# Patient Record
Sex: FEMALE | Race: WHITE | URBAN - METROPOLITAN AREA
[De-identification: names, ages, dates, MRNs, and addresses within clinical notes are randomized per-mention and may not be internally consistent; named-entity substitution may affect disease eponyms.]

---
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
## 2024-04-20 ENCOUNTER — APPOINTMENT (OUTPATIENT)
Facility: HOSPITAL | Age: 19
End: 2024-04-20
Payer: COMMERCIAL

## 2024-04-20 ENCOUNTER — HOSPITAL ENCOUNTER (EMERGENCY)
Facility: HOSPITAL | Age: 19
Discharge: HOME | End: 2024-04-20
Attending: EMERGENCY MEDICINE
Payer: COMMERCIAL

## 2024-04-20 VITALS
WEIGHT: 144 LBS | BODY MASS INDEX: 27.19 KG/M2 | DIASTOLIC BLOOD PRESSURE: 68 MMHG | HEART RATE: 79 BPM | SYSTOLIC BLOOD PRESSURE: 107 MMHG | OXYGEN SATURATION: 99 % | TEMPERATURE: 97.9 F | RESPIRATION RATE: 12 BRPM | HEIGHT: 61 IN

## 2024-04-20 DIAGNOSIS — R10.31 RIGHT LOWER QUADRANT ABDOMINAL PAIN: Primary | ICD-10-CM

## 2024-04-20 LAB
ALBUMIN SERPL-MCNC: 4.5 G/DL (ref 3.5–5)
ALBUMIN/GLOB SERPL: 1.6 (ref 1.1–2.2)
ALP SERPL-CCNC: 73 U/L (ref 45–117)
ALT SERPL-CCNC: 20 U/L (ref 12–78)
ANION GAP SERPL CALC-SCNC: 6 MMOL/L (ref 5–15)
APPEARANCE UR: CLEAR
AST SERPL W P-5'-P-CCNC: 13 U/L (ref 15–37)
BASOPHILS # BLD: 0.1 K/UL (ref 0–0.1)
BASOPHILS NFR BLD: 1 % (ref 0–1)
BILIRUB SERPL-MCNC: 0.5 MG/DL (ref 0.2–1)
BILIRUB UR QL: NEGATIVE
BUN SERPL-MCNC: 13 MG/DL (ref 6–20)
BUN/CREAT SERPL: 16 (ref 12–20)
CA-I BLD-MCNC: 9.5 MG/DL (ref 8.5–10.1)
CHLORIDE SERPL-SCNC: 110 MMOL/L (ref 97–108)
CO2 SERPL-SCNC: 23 MMOL/L (ref 21–32)
COLOR UR: ABNORMAL
CREAT SERPL-MCNC: 0.82 MG/DL (ref 0.55–1.02)
DIFFERENTIAL METHOD BLD: NORMAL
EOSINOPHIL # BLD: 0.2 K/UL (ref 0–0.4)
EOSINOPHIL NFR BLD: 2 % (ref 0–7)
ERYTHROCYTE [DISTWIDTH] IN BLOOD BY AUTOMATED COUNT: 12.3 % (ref 11.5–14.5)
GLOBULIN SER CALC-MCNC: 2.9 G/DL (ref 2–4)
GLUCOSE SERPL-MCNC: 104 MG/DL (ref 65–100)
GLUCOSE UR STRIP.AUTO-MCNC: NEGATIVE MG/DL
HCG SERPL QL: NEGATIVE
HCT VFR BLD AUTO: 42.9 % (ref 35–47)
HGB BLD-MCNC: 14.5 G/DL (ref 11.5–16)
HGB UR QL STRIP: NEGATIVE
IMM GRANULOCYTES # BLD AUTO: 0 K/UL (ref 0–0.04)
IMM GRANULOCYTES NFR BLD AUTO: 0 % (ref 0–0.5)
KETONES UR QL STRIP.AUTO: 20 MG/DL
LEUKOCYTE ESTERASE UR QL STRIP.AUTO: NEGATIVE
LIPASE SERPL-CCNC: 25 U/L (ref 13–75)
LYMPHOCYTES # BLD: 1.6 K/UL (ref 0.8–3.5)
LYMPHOCYTES NFR BLD: 16 % (ref 12–49)
MCH RBC QN AUTO: 29.6 PG (ref 26–34)
MCHC RBC AUTO-ENTMCNC: 33.8 G/DL (ref 30–36.5)
MCV RBC AUTO: 87.6 FL (ref 80–99)
MONOCYTES # BLD: 0.8 K/UL (ref 0–1)
MONOCYTES NFR BLD: 8 % (ref 5–13)
NEUTS SEG # BLD: 7.4 K/UL (ref 1.8–8)
NEUTS SEG NFR BLD: 73 % (ref 32–75)
NITRITE UR QL STRIP.AUTO: NEGATIVE
NRBC # BLD: 0 K/UL (ref 0–0.01)
NRBC BLD-RTO: 0 PER 100 WBC
PH UR STRIP: 7 (ref 5–8)
PLATELET # BLD AUTO: 250 K/UL (ref 150–400)
PMV BLD AUTO: 10.2 FL (ref 8.9–12.9)
POTASSIUM SERPL-SCNC: 3.6 MMOL/L (ref 3.5–5.1)
PROT SERPL-MCNC: 7.4 G/DL (ref 6.4–8.2)
PROT UR STRIP-MCNC: NEGATIVE MG/DL
RBC # BLD AUTO: 4.9 M/UL (ref 3.8–5.2)
SODIUM SERPL-SCNC: 139 MMOL/L (ref 136–145)
SP GR UR REFRACTOMETRY: <1.005 (ref 1–1.03)
UROBILINOGEN UR QL STRIP.AUTO: 0.1 EU/DL (ref 0.1–1)
WBC # BLD AUTO: 10.2 K/UL (ref 3.6–11)

## 2024-04-20 PROCEDURE — 96374 THER/PROPH/DIAG INJ IV PUSH: CPT

## 2024-04-20 PROCEDURE — 6360000002 HC RX W HCPCS: Performed by: EMERGENCY MEDICINE

## 2024-04-20 PROCEDURE — 83690 ASSAY OF LIPASE: CPT

## 2024-04-20 PROCEDURE — 6360000004 HC RX CONTRAST MEDICATION: Performed by: EMERGENCY MEDICINE

## 2024-04-20 PROCEDURE — 2580000003 HC RX 258: Performed by: EMERGENCY MEDICINE

## 2024-04-20 PROCEDURE — 36415 COLL VENOUS BLD VENIPUNCTURE: CPT

## 2024-04-20 PROCEDURE — 84703 CHORIONIC GONADOTROPIN ASSAY: CPT

## 2024-04-20 PROCEDURE — 99285 EMERGENCY DEPT VISIT HI MDM: CPT

## 2024-04-20 PROCEDURE — 81003 URINALYSIS AUTO W/O SCOPE: CPT

## 2024-04-20 PROCEDURE — 80053 COMPREHEN METABOLIC PANEL: CPT

## 2024-04-20 PROCEDURE — 74177 CT ABD & PELVIS W/CONTRAST: CPT

## 2024-04-20 PROCEDURE — 96375 TX/PRO/DX INJ NEW DRUG ADDON: CPT

## 2024-04-20 PROCEDURE — 85025 COMPLETE CBC W/AUTO DIFF WBC: CPT

## 2024-04-20 RX ORDER — 0.9 % SODIUM CHLORIDE 0.9 %
1000 INTRAVENOUS SOLUTION INTRAVENOUS ONCE
Status: COMPLETED | OUTPATIENT
Start: 2024-04-20 | End: 2024-04-20

## 2024-04-20 RX ORDER — ONDANSETRON 4 MG/1
4 TABLET, ORALLY DISINTEGRATING ORAL EVERY 8 HOURS PRN
Qty: 20 TABLET | Refills: 0 | Status: SHIPPED | OUTPATIENT
Start: 2024-04-20

## 2024-04-20 RX ORDER — MORPHINE SULFATE 4 MG/ML
4 INJECTION, SOLUTION INTRAMUSCULAR; INTRAVENOUS
Status: COMPLETED | OUTPATIENT
Start: 2024-04-20 | End: 2024-04-20

## 2024-04-20 RX ORDER — ONDANSETRON 2 MG/ML
4 INJECTION INTRAMUSCULAR; INTRAVENOUS ONCE
Status: COMPLETED | OUTPATIENT
Start: 2024-04-20 | End: 2024-04-20

## 2024-04-20 RX ORDER — KETOROLAC TROMETHAMINE 10 MG/1
10 TABLET, FILM COATED ORAL EVERY 6 HOURS PRN
Qty: 20 TABLET | Refills: 0 | Status: SHIPPED | OUTPATIENT
Start: 2024-04-20

## 2024-04-20 RX ADMIN — SODIUM CHLORIDE 1000 ML: 9 INJECTION, SOLUTION INTRAVENOUS at 08:15

## 2024-04-20 RX ADMIN — IOPAMIDOL 100 ML: 755 INJECTION, SOLUTION INTRAVENOUS at 08:10

## 2024-04-20 RX ADMIN — ONDANSETRON 4 MG: 2 INJECTION INTRAMUSCULAR; INTRAVENOUS at 08:17

## 2024-04-20 RX ADMIN — MORPHINE SULFATE 4 MG: 4 INJECTION INTRAVENOUS at 08:17

## 2024-04-20 ASSESSMENT — PAIN DESCRIPTION - LOCATION
LOCATION: ABDOMEN;BACK
LOCATION: ABDOMEN

## 2024-04-20 ASSESSMENT — PAIN - FUNCTIONAL ASSESSMENT
PAIN_FUNCTIONAL_ASSESSMENT: 0-10
PAIN_FUNCTIONAL_ASSESSMENT: 0-10

## 2024-04-20 ASSESSMENT — PAIN SCALES - GENERAL
PAINLEVEL_OUTOF10: 6
PAINLEVEL_OUTOF10: 4
PAINLEVEL_OUTOF10: 8

## 2024-04-20 ASSESSMENT — PAIN DESCRIPTION - ORIENTATION: ORIENTATION: LOWER;RIGHT

## 2024-04-20 NOTE — ED PROVIDER NOTES
hours as needed for Nausea or Vomiting 20 tablet 0    sucralfate (CARAFATE) 1 GM/10ML suspension Take 10 mLs by mouth 4 times daily 200 mL 0    omeprazole (PRILOSEC) 20 MG delayed release capsule Take 1 capsule by mouth every morning (before breakfast) 28 capsule 0       Social Determinants of Health:   Social Determinants of Health     Tobacco Use: Low Risk  (4/20/2024)    Patient History     Smoking Tobacco Use: Never     Smokeless Tobacco Use: Never     Passive Exposure: Not on file   Alcohol Use: Not At Risk (10/18/2023)    AUDIT-C     Frequency of Alcohol Consumption: Never     Average Number of Drinks: Patient does not drink     Frequency of Binge Drinking: Never   Financial Resource Strain: Not on file   Food Insecurity: Not on file   Transportation Needs: Not on file   Physical Activity: Not on file   Stress: Not on file   Social Connections: Not on file   Intimate Partner Violence: Not on file   Depression: Not on file   Housing Stability: Not on file   Interpersonal Safety: Not At Risk (4/20/2024)    Interpersonal Safety Domain Source: IP Abuse Screening     Physical abuse: Denies     Verbal abuse: Denies     Emotional abuse: Denies     Financial abuse: Denies     Sexual abuse: Denies   Utilities: Not on file       PHYSICAL EXAM   Physical Exam  Physical Exam  Constitutional:       General: No acute distress.     Appearance: Normal appearance.  Not toxic-appearing.   HENT:      Head: Normocephalic and atraumatic.      Nose: Nose normal.      Mouth/Throat:      Mouth: Mucous membranes are moist.   Eyes:      Extraocular Movements: Extraocular movements intact.      Pupils: Pupils are equal, round, and reactive to light.   Cardiovascular:      Rate and Rhythm: Normal rate.      Pulses: Normal pulses.   Pulmonary:      Effort: Pulmonary effort is normal.      Breath sounds: No stridor.   Abdominal:      General: Abdomen is flat. There is no distension.  Right lower quadrant tenderness to palpation with

## 2024-04-20 NOTE — ED TRIAGE NOTES
Patient states she has been having sharp pain to her right lower abdomen that started at 0400 this morning; pt states she also has been having vomiting

## 2024-04-20 NOTE — DISCHARGE INSTRUCTIONS
Thank you!  Thank you for allowing me to care for you in the emergency department. It is my goal to provide you with excellent care.  Please fill out the survey that will come to you by mail or email since we listen to your feedback!     Below you will find a list of your tests from today's visit.  Should you have any questions, please do not hesitate to call the emergency department.    Labs  Recent Results (from the past 12 hour(s))   CBC with Diff    Collection Time: 04/20/24  6:46 AM   Result Value Ref Range    WBC 10.2 3.6 - 11.0 K/uL    RBC 4.90 3.80 - 5.20 M/uL    Hemoglobin 14.5 11.5 - 16.0 g/dL    Hematocrit 42.9 35.0 - 47.0 %    MCV 87.6 80.0 - 99.0 FL    MCH 29.6 26.0 - 34.0 PG    MCHC 33.8 30.0 - 36.5 g/dL    RDW 12.3 11.5 - 14.5 %    Platelets 250 150 - 400 K/uL    MPV 10.2 8.9 - 12.9 FL    Nucleated RBCs 0.0 0.0  WBC    nRBC 0.00 0.00 - 0.01 K/uL    Neutrophils % 73 32 - 75 %    Lymphocytes % 16 12 - 49 %    Monocytes % 8 5 - 13 %    Eosinophils % 2 0 - 7 %    Basophils % 1 0 - 1 %    Immature Granulocytes % 0 0 - 0.5 %    Neutrophils Absolute 7.4 1.8 - 8.0 K/UL    Lymphocytes Absolute 1.6 0.8 - 3.5 K/UL    Monocytes Absolute 0.8 0.0 - 1.0 K/UL    Eosinophils Absolute 0.2 0.0 - 0.4 K/UL    Basophils Absolute 0.1 0.0 - 0.1 K/UL    Immature Granulocytes Absolute 0.0 0.00 - 0.04 K/UL    Differential Type AUTOMATED     CMP    Collection Time: 04/20/24  6:46 AM   Result Value Ref Range    Sodium 139 136 - 145 mmol/L    Potassium 3.6 3.5 - 5.1 mmol/L    Chloride 110 (H) 97 - 108 mmol/L    CO2 23 21 - 32 mmol/L    Anion Gap 6 5 - 15 mmol/L    Glucose 104 (H) 65 - 100 mg/dL    BUN 13 6 - 20 mg/dL    Creatinine 0.82 0.55 - 1.02 mg/dL    Bun/Cre Ratio 16 12 - 20      Est, Glom Filt Rate >90 >60 ml/min/1.73m2    Calcium 9.5 8.5 - 10.1 mg/dL    Total Bilirubin 0.5 0.2 - 1.0 mg/dL    AST 13 (L) 15 - 37 U/L    ALT 20 12 - 78 U/L    Alk Phosphatase 73 45 - 117 U/L    Total Protein 7.4 6.4 - 8.2 g/dL     your illness and treatment, and (3) for ongoing care. Please take your discharge instructions with you when you go to your follow-up appointment.     If you have any problem arranging a follow-up appointment, contact the Emergency Department.  If your symptoms become worse or you do not improve as expected and you are unable to reach your health care provider, please return to the Emergency Department. We are available 24 hours a day.     If a prescription has been provided, please have it filled as soon as possible to prevent a delay in treatment. If you have any questions or reservations about taking the medication due to side effects or interactions with other medications, please call your primary care provider or contact the ER.

## 2024-08-24 ENCOUNTER — OFFICE VISIT (OUTPATIENT)
Age: 19
End: 2024-08-24

## 2024-08-24 VITALS
BODY MASS INDEX: 26.81 KG/M2 | DIASTOLIC BLOOD PRESSURE: 71 MMHG | HEIGHT: 61 IN | WEIGHT: 142 LBS | SYSTOLIC BLOOD PRESSURE: 103 MMHG | OXYGEN SATURATION: 99 % | TEMPERATURE: 98.4 F | RESPIRATION RATE: 18 BRPM | HEART RATE: 103 BPM

## 2024-08-24 DIAGNOSIS — J18.9 PNEUMONIA OF RIGHT MIDDLE LOBE DUE TO INFECTIOUS ORGANISM: ICD-10-CM

## 2024-08-24 DIAGNOSIS — R05.1 ACUTE COUGH: Primary | ICD-10-CM

## 2024-08-24 LAB
Lab: NORMAL
PERFORMING INSTRUMENT: NORMAL
QC PASS/FAIL: NORMAL
SARS-COV-2, POC: NORMAL

## 2024-08-24 RX ORDER — TOPIRAMATE 100 MG/1
100 TABLET, FILM COATED ORAL 2 TIMES DAILY
COMMUNITY

## 2024-08-24 RX ORDER — AZITHROMYCIN 250 MG/1
TABLET, FILM COATED ORAL
Qty: 6 TABLET | Refills: 0 | Status: SHIPPED | OUTPATIENT
Start: 2024-08-24 | End: 2024-09-03

## 2024-08-24 ASSESSMENT — ENCOUNTER SYMPTOMS
COUGH: 1
SHORTNESS OF BREATH: 1

## 2024-08-24 NOTE — PROGRESS NOTES
2024   Peggy Vázquez (: 2005) is a 18 y.o. female, New patient, here for evaluation of the following chief complaint(s):  Shortness of Breath and Cough (Acute onset of deep productive, crackling in chest. Went to [facility] and was given breathing tx and prednisone pack, has not helped. Used inhaler at [address]2371 with no relief this AM. )     ASSESSMENT/PLAN:  Below is the assessment and plan developed based on review of pertinent history, physical exam, labs, studies, and medications.  1. Acute cough  -     POCT COVID-19, Antigen  -     XR CHEST (2 VIEWS); Future         Handout given with care instructions  2. OTC for symptom management. Increase fluid intake, ensure adequate nutritional intake.  3. Follow up with PCP as needed.  4. Go to ED with development of any acute symptoms.     Follow up:  No follow-ups on file.  Follow up immediately for any new, worsening or changes or if symptoms are not improving over the next 5-7 days.     SUBJECTIVE/OBJECTIVE:    Shortness of Breath    Cough  Associated symptoms include shortness of breath.        Diagnoses and all orders for this visit:  Acute cough  -     AMB POC STREP GO A DIRECT, DNA PROBE  -     POCT COVID-19, Antigen      Shortness of Breath and Cough (Acute onset of deep productive, crackling in chest. Went to [facility] and was given breathing tx and prednisone pack, has not helped. Used inhaler at 19547 with no relief this AM. )  On Breo with minimal relief.    Results for orders placed or performed in visit on 24   XR CHEST (2 VIEWS)    Narrative    Examination  Description: Chest xray, frontal and lateral views    Comparisons:  None provided.     Findings    Lungs and airways: Patchy opacities/probable consolidation within the left lower   lung, lingula?.    Pleura: No evidence of pneumothorax or pleural effusion.    Mediastinum: Cardiomediastinal silhouette is within normal limits.    Bones and soft tissues: No evidence of acute pathology of

## 2025-01-13 ENCOUNTER — OFFICE VISIT (OUTPATIENT)
Age: 20
End: 2025-01-13

## 2025-01-13 VITALS
DIASTOLIC BLOOD PRESSURE: 84 MMHG | TEMPERATURE: 98.8 F | WEIGHT: 169 LBS | OXYGEN SATURATION: 99 % | HEART RATE: 108 BPM | RESPIRATION RATE: 16 BRPM | BODY MASS INDEX: 31.91 KG/M2 | HEIGHT: 61 IN | SYSTOLIC BLOOD PRESSURE: 123 MMHG

## 2025-01-13 DIAGNOSIS — R11.14 BILIOUS VOMITING WITH NAUSEA: ICD-10-CM

## 2025-01-13 DIAGNOSIS — U07.1 COVID-19: Primary | ICD-10-CM

## 2025-01-13 LAB
INFLUENZA A ANTIGEN, POC: NEGATIVE
INFLUENZA B ANTIGEN, POC: NEGATIVE
Lab: ABNORMAL
PERFORMING INSTRUMENT: ABNORMAL
QC PASS/FAIL: ABNORMAL
SARS-COV-2, POC: DETECTED

## 2025-01-13 RX ORDER — DOXYCYCLINE HYCLATE 20 MG
20 TABLET ORAL 2 TIMES DAILY
COMMUNITY

## 2025-01-13 RX ORDER — METOCLOPRAMIDE 10 MG/1
10 TABLET ORAL
Qty: 30 TABLET | Refills: 0 | Status: SHIPPED | OUTPATIENT
Start: 2025-01-13

## 2025-01-13 NOTE — PATIENT INSTRUCTIONS
Janak Ms. Vázquez,    Sorry to hear about the COVID diagnosis.  I recommend a very aggressive regimen of vitamins and other natural remedies.  Start taking the following, and probably best to take it with a small meal or with food in your stomach.    Vitamin C oral tablets 500 mg, taking 1 tablet twice daily.    Zinc at 50 mg tablets, taking 1 tablet every morning.  Vitamin D3 at 1000 IU tablet, taking 1 tablet every evening.    Mucinex (guaifenesin) extra strength tablets, taking 1 tablet twice daily.  (Note, take plain old Mucinex and make sure there is nothing else on the ingredient list other than guaifenesin.  The extra-strength 12-hour version is the best).  This will help the most with head and chest congestion.    Use honey as a primary cough syrup.  You can certainly buy some of the natural honey based over-the-counter remedies such as Zarbee's, however, I personally take a jar of honey and eat a tablespoon or 2 as often as I can to help with my cough.  You could do 2 tablespoons every hour all day long if you wanted to.  There have been randomized controlled double blinded placebo based studies which have put honey against every other version of prescription cough medicine, and honey has always been proven to be more effective.    For body aches and fevers, I prefer Tylenol.  Take the 500 mg extra strength tablets, and take 2 tablets (1000 mg) every 6 hours.  This is the maximum dose, you can never have more than 4000 mg in a 24-hour period.  If this does not suffice, you can \"bridge the gap\" with ibuprofen 200 mg tablets by taking 3 of them (600 mg) around the 3-hour merced in between your Tylenol doses.  It is important to take ibuprofen with food, however.    I have had a lot of success with people in treating their COVID with the above regimen.  Please let me know if you have any issues and I will be happy to discuss further.  I would do the above for at least 1 week, but really as long as you are

## 2025-01-13 NOTE — PROGRESS NOTES
Peggy Vázquez (:  2005) is a 19 y.o. female,Established patient, here for evaluation of the following chief complaint(s):  Fever, Congestion, Generalized Body Aches, and Headache (Pt c/o body aches ongoing for two days, bilateral ear pain, headaches, sinus pain, fever (102.8), nausea, worsening since last night. She's taken tylenol with some relief. Pt is currently on doxycycline for bacterial infection.)         Assessment & Plan  COVID-19    Conservative measures discussed, I am not prescribing Paxlovid due to ineffectiveness and burden of side effects.  Red flag signs and strict return / ER precautions were discussed, and the patient expressed understanding and agreement to the plan. All questions were answered.       Orders:    POCT Influenza A/B Antigen    POCT COVID-19, Antigen    Bilious vomiting with nausea    2/2 COVID as above. Will send in Reglan since Zofran doesn't always work.    Orders:    metoclopramide (REGLAN) 10 MG tablet; Take 1 tablet by mouth 3 times daily (with meals) DO NOT TAKE AT SAME TIME AS ZOFRAN      Return in about 5 days (around 2025), or if symptoms worsen or fail to improve.       Subjective   Pt c/o body aches ongoing for two days, bilateral ear pain, headaches, sinus pain, fever (102.8F this morning), nausea, worsening since last night. She's taken tylenol with some relief. Pt is currently on doxycycline for GYN related complicated UTI that was prescribed by [facility] - she is on day 5 or 6 of that treatment.       History provided by:  Patient   used: No        Review of Systems   All other systems reviewed and are negative.         Objective   Physical Exam  Constitutional:       General: She is not in acute distress.     Appearance: Normal appearance. She is not ill-appearing.   HENT:      Head: Normocephalic and atraumatic.      Right Ear: External ear normal.      Left Ear: External ear normal.      Nose: Nose normal.   Eyes:      General:

## 2025-03-13 ENCOUNTER — OFFICE VISIT (OUTPATIENT)
Age: 20
End: 2025-03-13

## 2025-03-13 VITALS
BODY MASS INDEX: 32.66 KG/M2 | SYSTOLIC BLOOD PRESSURE: 121 MMHG | WEIGHT: 173 LBS | HEART RATE: 90 BPM | DIASTOLIC BLOOD PRESSURE: 77 MMHG | RESPIRATION RATE: 16 BRPM | OXYGEN SATURATION: 97 % | HEIGHT: 61 IN | TEMPERATURE: 99.3 F

## 2025-03-13 DIAGNOSIS — R50.9 FEVER, UNSPECIFIED FEVER CAUSE: Primary | ICD-10-CM

## 2025-03-13 DIAGNOSIS — J06.9 UPPER RESPIRATORY TRACT INFECTION, UNSPECIFIED TYPE: ICD-10-CM

## 2025-03-13 LAB
INFLUENZA A ANTIGEN, POC: NEGATIVE
INFLUENZA B ANTIGEN, POC: NEGATIVE
Lab: NORMAL
PERFORMING INSTRUMENT: NORMAL
QC PASS/FAIL: NORMAL
SARS-COV-2, POC: NORMAL

## 2025-03-13 RX ORDER — DUPILUMAB 300 MG/2ML
INJECTION, SOLUTION SUBCUTANEOUS
COMMUNITY
Start: 2025-03-06

## 2025-03-13 RX ORDER — BUDESONIDE, GLYCOPYRROLATE, AND FORMOTEROL FUMARATE 160; 9; 4.8 UG/1; UG/1; UG/1
2 AEROSOL, METERED RESPIRATORY (INHALATION) 2 TIMES DAILY
COMMUNITY
Start: 2025-01-27

## 2025-03-13 RX ORDER — OMEPRAZOLE 40 MG/1
CAPSULE, DELAYED RELEASE ORAL DAILY
COMMUNITY
Start: 2025-01-27

## 2025-03-13 ASSESSMENT — ENCOUNTER SYMPTOMS
COUGH: 1
WHEEZING: 0
SORE THROAT: 1
SHORTNESS OF BREATH: 0
CHEST TIGHTNESS: 0
SINUS PAIN: 1
SINUS PRESSURE: 1
RHINORRHEA: 1

## 2025-03-13 NOTE — PATIENT INSTRUCTIONS
- decongestants as needed  - Nasonex or Flonase or Nasacort  - Afrin - no more than 2-3 days - tilt head to side and yawn to help with ear pressure  - Advil or Tylenol-- you can alternate   - cool mist humidifer  - Nasal Rinse (Neti pot etc)      Thank you for choosing [facility].  I hope you feel better soon!

## 2025-03-13 NOTE — PROGRESS NOTES
3/13/2025   Peggy Vázquez (: 2005) is a 19 y.o. female, Established patient, here for evaluation of the following chief complaint(s):  Cold Symptoms (Patient presents for sore throat, teeth pain, congested with green mucus, fever and chills which started yesterday. )       ASSESSMENT/PLAN:  Below is the assessment and plan developed based on review of pertinent history, physical exam, labs, studies, and medications.  1. Fever, unspecified fever cause  -     POCT COVID-19, Antigen  -     POCT Influenza A/B Antigen  2. Upper respiratory tract infection, unspecified type       - - reviewed OTC treatments         Handout given with care instructions  Pt with stable VS, non-toxic appearing  Pt in no acute distress and afebrile   4.   OTC for symptom management. Increase fluid intake, ensure adequate nutritional intake.  5.   Follow up with PCP as needed.  6.   Go to ED with development of any acute symptoms.     Follow up:  Return if symptoms worsen or fail to improve.  Follow up immediately for any new, worsening or changes or if symptoms are not improving over the next 5-7 days.     SUBJECTIVE/OBJECTIVE:    Cold Symptoms   Associated symptoms include congestion, coughing, ear pain, headaches, rhinorrhea, sinus pain and a sore throat. Pertinent negatives include no wheezing.          Cold Symptoms (Patient presents for sore throat, teeth pain, congested with green mucus, fever and chills which started yesterday. )        Results for orders placed or performed in visit on 25   POCT COVID-19, Antigen   Result Value Ref Range    SARS-COV-2, POC Not-Detected Not Detected    Lot Number [id number]     QC Pass/Fail pass     Performing Instrument BinaxNOW    POCT Influenza A/B Antigen   Result Value Ref Range    Inflenza A Ag negative     Influenza B Ag negative            Review of Systems   Constitutional:  Positive for chills, fatigue and fever.   HENT:  Positive for congestion, ear pain, postnasal drip,

## 2025-04-18 ENCOUNTER — OFFICE VISIT (OUTPATIENT)
Age: 20
End: 2025-04-18

## 2025-04-18 VITALS
DIASTOLIC BLOOD PRESSURE: 74 MMHG | SYSTOLIC BLOOD PRESSURE: 120 MMHG | RESPIRATION RATE: 13 BRPM | TEMPERATURE: 98.6 F | OXYGEN SATURATION: 98 % | BODY MASS INDEX: 31.74 KG/M2 | HEART RATE: 99 BPM | WEIGHT: 168 LBS

## 2025-04-18 DIAGNOSIS — J45.901 MODERATE ASTHMA WITH EXACERBATION, UNSPECIFIED WHETHER PERSISTENT: ICD-10-CM

## 2025-04-18 DIAGNOSIS — J40 BRONCHITIS: Primary | ICD-10-CM

## 2025-04-18 DIAGNOSIS — R05.1 ACUTE COUGH: ICD-10-CM

## 2025-04-18 RX ORDER — PREDNISONE 20 MG/1
TABLET ORAL
Qty: 18 TABLET | Refills: 0 | Status: SHIPPED | OUTPATIENT
Start: 2025-04-18

## 2025-04-18 ASSESSMENT — ENCOUNTER SYMPTOMS
COUGH: 1
NAUSEA: 1
SORE THROAT: 1
CHEST TIGHTNESS: 1
SINUS PRESSURE: 1
VOMITING: 1
SHORTNESS OF BREATH: 1
WHEEZING: 1

## 2025-04-18 NOTE — PATIENT INSTRUCTIONS
Follow up with your pulmonologist        Please follow up with your primary care provider within 2-5 days if your signs and symptoms have not resolved or worsened.     Please go immediately to the Emergency Department if you develop shortness of breath, chest pain and uncontrollable fever above 100.4F.       A survey will be sent shortly to your phone/email.  Please complete this so we may know how to better serve you in the future.         Nasal Congestion:  Flonase (over the counter) nasal spray, once a day  Saline irrigation kits help wash out sinuses 1-2 times a day  Normal saline nasal spray       Cough:  Throat lozenges, hot tea, and honey may help  Vicks VapoRub at night to help with cough and relieve muscles aches and pain  If not prescribed a cough medication, Delsym is an option.  It is an over the counter cough medication containing dextromethorphan to help suppress cough at night              If you have high blood pressure, take Coricidin HBP (or the generic form) instead.  Follow instructions on the box.     Congestion:  For thick mucus, take Mucinex (with Guafenesin only) to help thin the mucus.  Follow instructions on the box.  You will need to drink plenty of water with this medication.     Sore Throat:  Lozenges, as needed. Cepacol lozenges will help numb the throat  Chloraseptic spray also helps to numb throat pain  Salt water gargles to soothe throat pain     Sinus pain/pressure:  Warm, wet towel on face to help with facial sinus pain/pressure  Neti Pot or Saline Rinse can be helpful   decongestants as needed  Nasonex or Flonase or Nasacort         Afrin - no more than 2-3 days - tilt head to side and yawn to help with ear pressure   Advil or Tylenol-- you can alternate   cool mist humidifer       Headache/Pain Fever/Body Aches:  Alternate every 4 hours:    Tylenol up to 1000mg  with Ibuprofen up to 800mg    (Do not take Tylenol or Ibuprofen more frequently then every 8 hours)

## 2025-04-18 NOTE — PROGRESS NOTES
2025   Peggy Vázquez (: 2005) is a 19 y.o. female, Established patient, here for evaluation of the following chief complaint(s):  Headache (C/o headache with nausea, congestion and shortness of breath started on Tuesday, inhaler and breathing treatments are not helping )       ASSESSMENT/PLAN:  Below is the assessment and plan developed based on review of pertinent history, physical exam, labs, studies, and medications.  1. Bronchitis  2. Moderate asthma with exacerbation, unspecified whether persistent  3. Acute cough  -     XR CHEST (2 VW); Future      - prednisone  -continue asthma medications     Xray:  Prelim read by me:  no evidence of pneumonia      Handout given with care instructions  Pt with stable VS, non-toxic appearing  Pt in no acute distress and afebrile   4.   OTC for symptom management. Increase fluid intake, ensure adequate nutritional intake.  5.   Follow up with PCP as needed.  6.   Go to ED with development of any acute symptoms.     Follow up:  Return if symptoms worsen or fail to improve.  Follow up immediately for any new, worsening or changes or if symptoms are not improving over the next 5-7 days.     SUBJECTIVE/OBJECTIVE:    Headache         Headache (C/o headache with nausea, congestion and shortness of breath started on Tuesday, inhaler and breathing treatments are not helping )        Results for orders placed or performed in visit on 25   XR CHEST (2 VW)    Narrative    Examination  Description: Chest xray, frontal and lateral views    Comparisons:   DX - XR CHEST (2 VIEWS), XR CHEST (2 VIEWS) - 24 15:01 EDT      Findings  The cardiomediastinal silhouette is unremarkable.    The lungs are clear. No consolidation is present.     No pleural effusion is noted.     There is no pneumothorax present.     The soft tissues and bones are unremarkable for age.       Impression    Negative examination for age. No consolidation.    Electronically signed by: Fermin BAKER

## 2025-05-30 ENCOUNTER — OFFICE VISIT (OUTPATIENT)
Age: 20
End: 2025-05-30

## 2025-05-30 VITALS
WEIGHT: 170 LBS | DIASTOLIC BLOOD PRESSURE: 70 MMHG | HEART RATE: 89 BPM | HEIGHT: 61 IN | TEMPERATURE: 98.8 F | SYSTOLIC BLOOD PRESSURE: 136 MMHG | BODY MASS INDEX: 32.1 KG/M2 | RESPIRATION RATE: 18 BRPM | OXYGEN SATURATION: 97 %

## 2025-05-30 DIAGNOSIS — S16.1XXA ACUTE STRAIN OF NECK MUSCLE, INITIAL ENCOUNTER: Primary | ICD-10-CM

## 2025-05-30 DIAGNOSIS — L55.9 SUNBURN: ICD-10-CM

## 2025-05-30 RX ORDER — MECLIZINE HYDROCHLORIDE 25 MG/1
25 TABLET ORAL 3 TIMES DAILY PRN
Qty: 15 TABLET | Refills: 0 | Status: SHIPPED | OUTPATIENT
Start: 2025-05-30 | End: 2025-06-09

## 2025-05-30 RX ORDER — METHOCARBAMOL 750 MG/1
TABLET, FILM COATED ORAL
Qty: 30 TABLET | Refills: 0 | Status: SHIPPED | OUTPATIENT
Start: 2025-05-30

## 2025-05-30 RX ORDER — KETOROLAC TROMETHAMINE 30 MG/ML
30 INJECTION, SOLUTION INTRAMUSCULAR; INTRAVENOUS ONCE
Status: COMPLETED | OUTPATIENT
Start: 2025-05-30 | End: 2025-05-30

## 2025-05-30 RX ADMIN — KETOROLAC TROMETHAMINE 30 MG: 30 INJECTION, SOLUTION INTRAMUSCULAR; INTRAVENOUS at 09:06

## 2025-05-30 ASSESSMENT — ENCOUNTER SYMPTOMS
COLOR CHANGE: 1
RESPIRATORY NEGATIVE: 1
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
